# Patient Record
Sex: FEMALE | Race: WHITE
[De-identification: names, ages, dates, MRNs, and addresses within clinical notes are randomized per-mention and may not be internally consistent; named-entity substitution may affect disease eponyms.]

---

## 2017-04-04 ENCOUNTER — HOSPITAL ENCOUNTER (OUTPATIENT)
Age: 32
Discharge: HOME | End: 2017-04-04
Payer: MEDICAID

## 2017-04-04 DIAGNOSIS — R07.89: Primary | ICD-10-CM

## 2017-05-09 ENCOUNTER — HOSPITAL ENCOUNTER (OUTPATIENT)
Dept: HOSPITAL 76 - SC | Age: 32
Discharge: HOME | End: 2017-05-09
Attending: NURSE PRACTITIONER
Payer: MEDICAID

## 2017-05-09 DIAGNOSIS — G47.8: Primary | ICD-10-CM

## 2017-05-09 DIAGNOSIS — G47.10: ICD-10-CM

## 2017-05-09 DIAGNOSIS — R06.83: ICD-10-CM

## 2017-05-09 PROCEDURE — 99212 OFFICE O/P EST SF 10 MIN: CPT

## 2017-05-09 PROCEDURE — 99204 OFFICE O/P NEW MOD 45 MIN: CPT

## 2017-06-19 ENCOUNTER — HOSPITAL ENCOUNTER (OUTPATIENT)
Dept: HOSPITAL 76 - SC | Age: 32
Discharge: HOME | End: 2017-06-19
Attending: INTERNAL MEDICINE
Payer: MEDICAID

## 2017-06-19 DIAGNOSIS — G47.10: Primary | ICD-10-CM

## 2017-06-19 DIAGNOSIS — R06.83: ICD-10-CM

## 2017-06-19 PROCEDURE — 95810 POLYSOM 6/> YRS 4/> PARAM: CPT

## 2018-07-24 ENCOUNTER — HOSPITAL ENCOUNTER (OUTPATIENT)
Dept: HOSPITAL 76 - LAB.F | Age: 33
Discharge: HOME | End: 2018-07-24
Attending: ADVANCED PRACTICE MIDWIFE
Payer: COMMERCIAL

## 2018-07-24 DIAGNOSIS — Z01.419: Primary | ICD-10-CM

## 2018-07-24 LAB
CHOLEST SERPL-MCNC: 238 MG/DL
EST. AVERAGE GLUCOSE BLD GHB EST-MCNC: 85 MG/DL (ref 70–100)
GLUCOSE SERPL-MCNC: 93 MG/DL (ref 70–100)
HB2 TOTAL: 16.6 G/DL
HBA1C BLD-MCNC: 0.45 G/DL
HDLC SERPL-MCNC: 66 MG/DL
HDLC SERPL: 3.6 {RATIO} (ref ?–4.4)
HEMOGLOBIN A1C %: 4.6 % (ref 4.6–6.2)
LDLC SERPL CALC-MCNC: 155 MG/DL
LDLC/HDLC SERPL: 2.3 {RATIO} (ref ?–4.4)
VLDLC SERPL-SCNC: 17 MG/DL

## 2018-07-24 PROCEDURE — 82947 ASSAY GLUCOSE BLOOD QUANT: CPT

## 2018-07-24 PROCEDURE — 83721 ASSAY OF BLOOD LIPOPROTEIN: CPT

## 2018-07-24 PROCEDURE — 80061 LIPID PANEL: CPT

## 2018-07-24 PROCEDURE — 83036 HEMOGLOBIN GLYCOSYLATED A1C: CPT

## 2018-07-24 PROCEDURE — 36415 COLL VENOUS BLD VENIPUNCTURE: CPT

## 2018-07-24 PROCEDURE — 84443 ASSAY THYROID STIM HORMONE: CPT

## 2019-11-05 ENCOUNTER — HOSPITAL ENCOUNTER (OUTPATIENT)
Dept: HOSPITAL 76 - DI.S | Age: 34
Discharge: HOME | End: 2019-11-05
Attending: NURSE PRACTITIONER
Payer: COMMERCIAL

## 2019-11-05 DIAGNOSIS — J45.909: Primary | ICD-10-CM

## 2019-11-05 PROCEDURE — 71046 X-RAY EXAM CHEST 2 VIEWS: CPT

## 2019-11-06 NOTE — XRAY REPORT
Reason:  ASTHMA, J45.909

Procedure Date:  11/05/2019   

Accession Number:  [id number] / [id number]                    

Procedure:  XRS - Chest 2 View X-Ray CPT Code:  38095

 

***Final Report***

 

 

FULL RESULT:

 

 

EXAM:

CHEST RADIOGRAPHY

 

EXAM DATE: 11/5/2019 01:22 PM.

 

CLINICAL HISTORY: ASTHMA, J45. 909.

 

COMPARISON: None.

 

TECHNIQUE: 2 views.

 

FINDINGS:

Lungs/Pleura: Bilateral central bronchial wall thickening is noted. No 

superimposed focal airspace consolidation. No pleural effusion or 

pneumothorax.

 

Mediastinum: Heart and mediastinal contours are unremarkable.

 

Other: None.

IMPRESSION:

1. Bilateral central bronchial wall thickening could reflect underlying 

reactive airways or bronchitis. No evidence of pneumonia.

 

RADIA

## 2020-01-20 ENCOUNTER — HOSPITAL ENCOUNTER (OUTPATIENT)
Dept: HOSPITAL 76 - LAB.R | Age: 35
Discharge: HOME | End: 2020-01-20
Attending: ADVANCED PRACTICE MIDWIFE
Payer: COMMERCIAL

## 2020-01-20 DIAGNOSIS — Z11.3: Primary | ICD-10-CM

## 2020-01-20 LAB — T VAGINALIS RRNA GENITAL QL PROBE: NEGATIVE

## 2020-01-20 PROCEDURE — 87491 CHLMYD TRACH DNA AMP PROBE: CPT

## 2020-01-20 PROCEDURE — 87661 TRICHOMONAS VAGINALIS AMPLIF: CPT

## 2020-01-20 PROCEDURE — 87591 N.GONORRHOEAE DNA AMP PROB: CPT

## 2021-02-08 ENCOUNTER — HOSPITAL ENCOUNTER (EMERGENCY)
Dept: HOSPITAL 76 - ED | Age: 36
Discharge: HOME | End: 2021-02-08
Payer: COMMERCIAL

## 2021-02-08 VITALS — SYSTOLIC BLOOD PRESSURE: 139 MMHG | DIASTOLIC BLOOD PRESSURE: 93 MMHG

## 2021-02-08 DIAGNOSIS — J18.9: Primary | ICD-10-CM

## 2021-02-08 DIAGNOSIS — Z20.822: ICD-10-CM

## 2021-02-08 DIAGNOSIS — J45.901: ICD-10-CM

## 2021-02-08 LAB
ANION GAP SERPL CALCULATED.4IONS-SCNC: 11 MMOL/L (ref 6–13)
BASOPHILS NFR BLD AUTO: 0 10^3/UL (ref 0–0.1)
BASOPHILS NFR BLD AUTO: 0.2 %
BUN SERPL-MCNC: 8 MG/DL (ref 6–20)
C PNEUM DNA NPH QL NAA+NON-PROBE: NOT DETECTED
CALCIUM UR-MCNC: 9.1 MG/DL (ref 8.5–10.3)
CHLORIDE SERPL-SCNC: 105 MMOL/L (ref 101–111)
CO2 SERPL-SCNC: 22 MMOL/L (ref 21–32)
CREAT SERPLBLD-SCNC: 0.9 MG/DL (ref 0.4–1)
EOSINOPHIL # BLD AUTO: 0.9 10^3/UL (ref 0–0.7)
EOSINOPHIL NFR BLD AUTO: 9.4 %
ERYTHROCYTE [DISTWIDTH] IN BLOOD BY AUTOMATED COUNT: 12.1 % (ref 12–15)
GLUCOSE SERPL-MCNC: 107 MG/DL (ref 70–100)
HGB UR QL STRIP: 15.7 G/DL (ref 12–16)
LYMPHOCYTES # SPEC AUTO: 2.2 10^3/UL (ref 1.5–3.5)
LYMPHOCYTES NFR BLD AUTO: 22.8 %
MCH RBC QN AUTO: 34.7 PG (ref 27–31)
MCHC RBC AUTO-ENTMCNC: 34.6 G/DL (ref 32–36)
MCV RBC AUTO: 100.4 FL (ref 81–99)
MONOCYTES # BLD AUTO: 0.6 10^3/UL (ref 0–1)
MONOCYTES NFR BLD AUTO: 6.4 %
NEUTROPHILS # BLD AUTO: 5.8 10^3/UL (ref 1.5–6.6)
NEUTROPHILS # SNV AUTO: 9.5 X10^3/UL (ref 4.8–10.8)
NEUTROPHILS NFR BLD AUTO: 61.1 %
PDW BLD AUTO: 9.4 FL (ref 7.9–10.8)
PLATELET # BLD: 293 10^3/UL (ref 130–450)
RBC MAR: 4.52 10^6/UL (ref 4.2–5.4)

## 2021-02-08 PROCEDURE — 36415 COLL VENOUS BLD VENIPUNCTURE: CPT

## 2021-02-08 PROCEDURE — 96375 TX/PRO/DX INJ NEW DRUG ADDON: CPT

## 2021-02-08 PROCEDURE — 94640 AIRWAY INHALATION TREATMENT: CPT

## 2021-02-08 PROCEDURE — 85025 COMPLETE CBC W/AUTO DIFF WBC: CPT

## 2021-02-08 PROCEDURE — 96365 THER/PROPH/DIAG IV INF INIT: CPT

## 2021-02-08 PROCEDURE — 0202U NFCT DS 22 TRGT SARS-COV-2: CPT

## 2021-02-08 PROCEDURE — 99284 EMERGENCY DEPT VISIT MOD MDM: CPT

## 2021-02-08 PROCEDURE — 71045 X-RAY EXAM CHEST 1 VIEW: CPT

## 2021-02-08 PROCEDURE — 80048 BASIC METABOLIC PNL TOTAL CA: CPT

## 2021-02-08 NOTE — ED PHYSICIAN DOCUMENTATION
PD HPI DYSPNEA





- Stated complaint


Stated Complaint: SOA





- History obtained from


History obtained from: Patient





- History of Present Illness


Timing - onset: How many days ago (2)


Timing - details: Gradual onset, Waxing and waning


Pain level max: 0


Pain level now: 0


Improved by: Rest


Worsened by: Exertion, Coughing


Associated symptoms: Cough, Wheezing.  No: Fever, Chest pain / discomfort, 

Bilateral edema, Unilateral edema


Similar symptoms before: Diagnosis (asthma)


Recently seen: Clinic





- Additional information


Additional information: 





c/o 1-2 days of gradual onset, gradually worsening dyspnea, wheezing, and 

productive cough. She says she had similar symptoms approximately 1 year ago and

went through two rounds of antibiotics, eventually evaluated by an allergist and

diagnosed with asthma. She says the allergist performed allergy tests which also

indicated allergy to several environmental allergens. 


Patient was evaluated at a walk-in clinic yesterday, prescribed an inhaler and a

steroid. She has been using the inhaler without adequate improvement. She did 

not start the steroid yet; she says the pharmacist advised her to wait until the

morning, as it might cause insomnia. Patient denies fever, myalgias.





Review of Systems


Constitutional: denies: Fever, Chills, Myalgias, Sweats


Cardiac: denies: Chest pain / pressure, Palpitations, Pedal edema


Respiratory: reports: Dyspnea, Cough, Wheezing





PD PAST MEDICAL HISTORY





- Past Medical History


Cardiovascular: None


Respiratory: None


Endocrine/Autoimmune: None


GI: None


GYN: None


: None


HEENT: None


Psych: Depression, Anxiety, Bipolar disorder


Musculoskeletal: None


Derm: None





- Past Surgical History


Past Surgical History: Yes





- Present Medications


Home Medications: 


                                Ambulatory Orders











 Medication  Instructions  Recorded  Confirmed


 


Nexplanon  11/04/16 


 


ARIPiprazole [Aripiprazole Odt] 15 mg PO DAILY 02/08/21 02/08/21


 


Albuterol Sulfate [Proair 2 puffs IH Q4HR PRN 02/08/21 02/08/21





Digihaler]   


 


Azithromycin [Zithromax] 250 mg PO DAILY #4 tab 02/08/21 


 


Montelukast Sodium 10 mg PO DAILY 02/08/21 02/08/21


 


Omeprazole Magnesium 20 mg PO DAILY 02/08/21 02/08/21














- Allergies


Allergies/Adverse Reactions: 


                                    Allergies











Allergy/AdvReac Type Severity Reaction Status Date / Time


 


No Known Drug Allergies Allergy   Verified 02/08/21 04:18














- Social History


Does the pt smoke?: No


Smoking Status: Never smoker


Does the pt drink ETOH?: Yes


Does the pt have substance abuse?: No





- Immunizations


Immunizations are current?: Yes





- POLST


Patient has POLST: No





PD ED PE NORMAL





- Vitals


Vital signs reviewed: Yes





- General


General: Alert and oriented X 3, Well developed/nourished, Other (dyspneic, 

tachypneic, grossly audible wheezing. able to speak 2-3 words at a time due to 

dyspnea)





- HEENT


HEENT: Moist mucous membranes





- Neck


Neck: Supple, no meningeal sign, No JVD





- Cardiac


Cardiac: No murmur





PD ED PE EXPANDED





- Cardiac


Cardiac: Tachy, Regular Rhythm





- Respiratory


Respiratory: Wheezing (inspiratory and expiratory), Decreased breath sounds





Results





- Vitals


Vitals: 


                               Vital Signs - 24 hr











  02/08/21 02/08/21 02/08/21





  04:05 04:21 04:31


 


Temperature 36.6 C  


 


Heart Rate 120 H 115 H 123 H


 


Respiratory 32 H 15 16





Rate   


 


Blood Pressure 150/115 H  134/83 H


 


O2 Saturation 96  97














  02/08/21 02/08/21 02/08/21





  04:44 05:02 05:14


 


Temperature 36.6 C  36.8 C


 


Heart Rate 101 H 101 H 100


 


Respiratory 23 22 24





Rate   


 


Blood Pressure 137/98 H  113/88 H


 


O2 Saturation 97  97














  02/08/21 02/08/21 02/08/21





  05:30 06:00 06:20


 


Temperature 36.7 C 36.7 C 36.6 C


 


Heart Rate 118 H 104 H 103 H


 


Respiratory 21 22 24





Rate   


 


Blood Pressure 131/94 H 147/85 H 139/93 H


 


O2 Saturation 96 97 98








                                     Oxygen











O2 Source                      Room air

















- Labs


Labs: 


                                Laboratory Tests











  02/08/21 02/08/21 02/08/21





  04:14 04:14 05:47


 


WBC  9.5  


 


RBC  4.52  


 


Hgb  15.7  


 


Hct  45.4  


 


MCV  100.4 H  


 


MCH  34.7 H  


 


MCHC  34.6  


 


RDW  12.1  


 


Plt Count  293  


 


MPV  9.4  


 


Neut # (Auto)  5.8  


 


Lymph # (Auto)  2.2  


 


Mono # (Auto)  0.6  


 


Eos # (Auto)  0.9 H  


 


Baso # (Auto)  0.0  


 


Absolute Nucleated RBC  0.00  


 


Nucleated RBC %  0.0  


 


Sodium   138 


 


Potassium   3.3 L 


 


Chloride   105 


 


Carbon Dioxide   22 


 


Anion Gap   11.0 


 


BUN   8 


 


Creatinine   0.9 


 


Estimated GFR (MDRD)   72 L 


 


Glucose   107 H 


 


Calcium   9.1 


 


Nasal Adenovirus (PCR)    NOT DETECTED


 


Nasal B. parapertussis DNA (PCR)    NOT DETECTED


 


Nasal Coronavir 229E PCR    NOT DETECTED


 


Nasal Coronavir HKU1 PCR    NOT DETECTED


 


Nasal Coronavir NL63 PCR    NOT DETECTED


 


Nasal Coronavir OC43 PCR    NOT DETECTED


 


Nasal Enterovir/Rhinovir PCR    NOT DETECTED


 


Nasal Influenza B PCR    NOT DETECTED


 


Nasal Influenza A PCR    NOT DETECTED


 


Nasal Parainfluen 1 PCR    NOT DETECTED


 


Nasal Parainfluen 2 PCR    NOT DETECTED


 


Nasal Parainfluen 3 PCR    NOT DETECTED


 


Nasal Parainfluen 4 PCR    NOT DETECTED


 


Nasal RSV (PCR)    NOT DETECTED


 


Nasal B.pertussis DNA PCR    NOT DETECTED


 


Nasal C.pneumoniae (PCR)    NOT DETECTED


 


Moises Human Metapneumo PCR    NOT DETECTED


 


Nasal M.pneumoniae (PCR)    NOT DETECTED


 


Nasal SARS-CoV-2 (PCR)    NOT DETECTED














- Rads (name of study)


  ** chest xray


Radiology: Prelim report reviewed, See rad report





PD MEDICAL DECISION MAKING





- ED course


Complexity details: reviewed results, re-evaluated patient, considered 

differential, d/w patient


ED course: 





patient reports substantial improvement after duoneb. also given IV solu-medrol 

125mg. She no longer has the wheezing that was audible when I was standing at 

bedside. On auscultation, she has persistent expiratory wheezing, mild 

inspiratory wheezing, but improved aeration (breath sounds are no longer 

distant). She is able to speak in full sentences without difficulty. She is 

given a second neb treatment (albuterol) and reported further improvement with 

this although no noticeable change on auscultation. CXR interpreted by 

radiologist as interstitial RLL infiltrate and thus she is also given IV 

rocephin and PO zithromax with rx for zithromax. She is comfortable with d/c 

home. Given a work note and instructed to take the steroids that had already 

been prescribed starting today around mid-day or early afternoon. 





Departure





- Departure


Disposition: 01 Home, Self Care


Clinical Impression: 


Pneumonia


Qualifiers:


 Pneumonia type: due to unspecified organism Laterality: right Lung location: 

lower lobe of lung Qualified Code(s): J18.9 - Pneumonia, unspecified organism





Asthma attack


Qualifiers:


 Asthma severity: moderate Asthma persistence: unspecified Qualified Code(s): 

J45.901 - Unspecified asthma with (acute) exacerbation





Condition: Good


Instructions:  ED Reactive Airway Disease, ED Pneumonia Adult


Prescriptions: 


Azithromycin [Zithromax] 250 mg PO DAILY #4 tab


Comments: 


Take the steroid (which you have indicated was already prescribed) starting with

 the first dose to be taken around noon today (then as indicated by the 

instructions on the prescription label). 


Take your next dose of azithromycin (the antibiotic prescribed from this 

emergency department) tomorrow morning (you were given an initial dose in the 

emergency department). 


Forms:  Activity restrictions


Discharge Date/Time: 02/08/21 06:22

## 2021-02-08 NOTE — XRAY REPORT
PROCEDURE:  Chest 1 View X-Ray

 

INDICATIONS:  dyspnea

 

TECHNIQUE:  One view of the chest was acquired.  

 

COMPARISON:  11/5/2019

 

FINDINGS:  

 

Surgical changes and devices:  None.  

 

Lungs and pleura:  No pleural effusions or pneumothorax. Increased opacification in the right lung ba
se.

 

Mediastinum:  Mediastinal contours appear normal.  Heart size is normal.  

 

Bones and chest wall:  No suspicious bony lesions.  Overlying soft tissues appear unremarkable.  

 

 

IMPRESSION:  

 

Right basilar pneumonia versus atelectasis.

 

Reviewed by: Jenn Zabala MD, PhD on 2/8/2021 7:03 AM PST

Approved by: Jenn Zabala MD, PhD on 2/8/2021 7:03 AM [facility]

 

 

Station ID:  [id number]

## 2021-03-18 ENCOUNTER — HOSPITAL ENCOUNTER (OUTPATIENT)
Dept: HOSPITAL 76 - COV | Age: 36
Discharge: HOME | End: 2021-03-18
Attending: FAMILY MEDICINE
Payer: COMMERCIAL

## 2021-03-18 DIAGNOSIS — Z20.822: Primary | ICD-10-CM

## 2021-06-25 ENCOUNTER — HOSPITAL ENCOUNTER (OUTPATIENT)
Dept: HOSPITAL 76 - LAB.S | Age: 36
Discharge: HOME | End: 2021-06-25
Attending: EMERGENCY MEDICINE
Payer: COMMERCIAL

## 2021-06-25 DIAGNOSIS — Z20.822: Primary | ICD-10-CM

## 2023-03-24 ENCOUNTER — HOSPITAL ENCOUNTER (OUTPATIENT)
Dept: HOSPITAL 76 - LAB | Age: 38
Discharge: HOME | End: 2023-03-24
Payer: COMMERCIAL

## 2023-03-24 DIAGNOSIS — E66.01: Primary | ICD-10-CM

## 2023-03-24 LAB
EST. AVERAGE GLUCOSE BLD GHB EST-MCNC: 94 MG/DL (ref 70–100)
HBA1C MFR BLD HPLC: 4.9 % (ref 4.27–6.07)

## 2023-03-24 PROCEDURE — 83036 HEMOGLOBIN GLYCOSYLATED A1C: CPT

## 2023-03-24 PROCEDURE — 36415 COLL VENOUS BLD VENIPUNCTURE: CPT

## 2023-07-13 ENCOUNTER — HOSPITAL ENCOUNTER (OUTPATIENT)
Dept: HOSPITAL 76 - SC | Age: 38
Discharge: HOME | End: 2023-07-13
Attending: NURSE PRACTITIONER
Payer: COMMERCIAL

## 2023-07-13 VITALS — SYSTOLIC BLOOD PRESSURE: 144 MMHG | DIASTOLIC BLOOD PRESSURE: 92 MMHG

## 2023-07-13 DIAGNOSIS — R06.81: ICD-10-CM

## 2023-07-13 DIAGNOSIS — G47.10: Primary | ICD-10-CM

## 2023-07-13 DIAGNOSIS — F32.A: ICD-10-CM

## 2023-07-13 DIAGNOSIS — R06.83: ICD-10-CM

## 2023-07-13 DIAGNOSIS — R53.83: ICD-10-CM

## 2023-07-13 DIAGNOSIS — G47.8: ICD-10-CM

## 2023-07-13 DIAGNOSIS — E66.01: ICD-10-CM

## 2023-07-13 DIAGNOSIS — Z87.891: ICD-10-CM

## 2023-07-13 PROCEDURE — 99212 OFFICE O/P EST SF 10 MIN: CPT

## 2023-07-13 PROCEDURE — 99203 OFFICE O/P NEW LOW 30 MIN: CPT
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
## 2023-07-13 NOTE — SLEEP PATIENT INSTRUCTIONS
Sleep Center Visit Summary





- Patient Visit Information


Reason for Visit: 





Initial consult





- Patient Instructions


Instructions Attached:  Sleep Study, Sleep Clinic Visit, Sleep Study Home 

Monitor


Additional Instructions: 





You will be completing a sleep study, either an in-lab polysomnography (PSG) or 

home sleep study (HST).  You will follow-up in the sleep care office after the 

sleep study is completed to hear the results and talk about therapy, if needed. 







You will be called by our office staff to schedule this appointment, but you may

contact us with any questions.





- Clinic Information


Contact: 





[facility]


[address] [place] [id number]


[email or web address]


T: [phone]